# Patient Record
Sex: FEMALE | Race: BLACK OR AFRICAN AMERICAN | Employment: UNEMPLOYED | URBAN - NONMETROPOLITAN AREA
[De-identification: names, ages, dates, MRNs, and addresses within clinical notes are randomized per-mention and may not be internally consistent; named-entity substitution may affect disease eponyms.]

---

## 2019-10-24 ENCOUNTER — HOSPITAL ENCOUNTER (OUTPATIENT)
Dept: INPATIENT UNIT | Age: 64
Discharge: HOME | End: 2019-10-24
Attending: INTERNAL MEDICINE | Admitting: INTERNAL MEDICINE
Payer: COMMERCIAL

## 2019-10-24 VITALS
HEART RATE: 72 BPM | HEIGHT: 62 IN | OXYGEN SATURATION: 93 % | BODY MASS INDEX: 38.09 KG/M2 | DIASTOLIC BLOOD PRESSURE: 61 MMHG | SYSTOLIC BLOOD PRESSURE: 132 MMHG | WEIGHT: 207 LBS | TEMPERATURE: 97.7 F | RESPIRATION RATE: 26 BRPM

## 2019-10-24 LAB
ABO: NORMAL
ALBUMIN SERPL-MCNC: 3.8 G/DL (ref 3.5–5.1)
ALP BLD-CCNC: 59 U/L
ALT SERPL-CCNC: 8 U/L (ref 11–66)
ANION GAP SERPL CALCULATED.3IONS-SCNC: 12 MEQ/L (ref 8–16)
ANTIBODY SCREEN: NORMAL
AST SERPL-CCNC: 11 U/L (ref 5–40)
BILIRUB SERPL-MCNC: 0.8 MG/DL (ref 0.3–1.2)
BUN BLDV-MCNC: 10 MG/DL (ref 7–22)
CALCIUM SERPL-MCNC: 9.4 MG/DL (ref 8.5–10.5)
CHLORIDE BLD-SCNC: 105 MEQ/L (ref 98–111)
CHOLESTEROL, TOTAL: 139 MG/DL (ref 100–199)
CO2: 23 MEQ/L (ref 23–33)
COLLECTED BY:: ABNORMAL
CREAT SERPL-MCNC: 0.8 MG/DL (ref 0.4–1.2)
EKG ATRIAL RATE: 48 BPM
EKG P AXIS: 49 DEGREES
EKG P-R INTERVAL: 166 MS
EKG Q-T INTERVAL: 430 MS
EKG QRS DURATION: 84 MS
EKG QTC CALCULATION (BAZETT): 384 MS
EKG R AXIS: 38 DEGREES
EKG T AXIS: 35 DEGREES
EKG VENTRICULAR RATE: 48 BPM
ERYTHROCYTE [DISTWIDTH] IN BLOOD BY AUTOMATED COUNT: 13 % (ref 11.5–14.5)
ERYTHROCYTE [DISTWIDTH] IN BLOOD BY AUTOMATED COUNT: 45.5 FL (ref 35–45)
GFR SERPL CREATININE-BSD FRML MDRD: > 90 ML/MIN/1.73M2
GLUCOSE BLD-MCNC: 91 MG/DL (ref 70–108)
HCT VFR BLD CALC: 35.1 %
HDLC SERPL-MCNC: 47 MG/DL
HEMOGLOBIN: 11.4 GM/DL
INR BLD: 1.01 (ref 0.85–1.13)
LDL CHOLESTEROL CALCULATED: 74 MG/DL
MCH RBC QN AUTO: 31.2 PG (ref 26–33)
MCHC RBC AUTO-ENTMCNC: 32.5 GM/DL (ref 32.2–35.5)
MCV RBC AUTO: 96.2 FL
PLATELET # BLD: 225 THOU/MM3 (ref 130–400)
PMV BLD AUTO: 10.5 FL (ref 9.4–12.4)
POC O2 SATURATION: 64 % (ref 94–97)
POC O2 SATURATION: 67 % (ref 94–97)
POC O2 SATURATION: 67 % (ref 94–97)
POC O2 SATURATION: 93 % (ref 94–97)
POTASSIUM REFLEX MAGNESIUM: 4.5 MEQ/L (ref 3.5–5.2)
RBC # BLD: 3.65 MILL/MM3
RH FACTOR: NORMAL
SODIUM BLD-SCNC: 140 MEQ/L (ref 135–145)
SOURCE, BLOOD GAS: ABNORMAL
TOTAL PROTEIN: 7.6 G/DL (ref 6.1–8)
TRIGL SERPL-MCNC: 88 MG/DL (ref 0–199)
WBC # BLD: 7.7 THOU/MM3 (ref 4.8–10.8)

## 2019-10-24 PROCEDURE — 93460 R&L HRT ART/VENTRICLE ANGIO: CPT | Performed by: INTERNAL MEDICINE

## 2019-10-24 PROCEDURE — C1887 CATHETER, GUIDING: HCPCS

## 2019-10-24 PROCEDURE — 85027 COMPLETE CBC AUTOMATED: CPT

## 2019-10-24 PROCEDURE — 2500000003 HC RX 250 WO HCPCS

## 2019-10-24 PROCEDURE — 2709999900 HC NON-CHARGEABLE SUPPLY

## 2019-10-24 PROCEDURE — 85610 PROTHROMBIN TIME: CPT

## 2019-10-24 PROCEDURE — 2580000003 HC RX 258: Performed by: INTERNAL MEDICINE

## 2019-10-24 PROCEDURE — C1769 GUIDE WIRE: HCPCS

## 2019-10-24 PROCEDURE — 6360000002 HC RX W HCPCS

## 2019-10-24 PROCEDURE — 6370000000 HC RX 637 (ALT 250 FOR IP): Performed by: INTERNAL MEDICINE

## 2019-10-24 PROCEDURE — 86900 BLOOD TYPING SEROLOGIC ABO: CPT

## 2019-10-24 PROCEDURE — 80053 COMPREHEN METABOLIC PANEL: CPT

## 2019-10-24 PROCEDURE — 93005 ELECTROCARDIOGRAM TRACING: CPT | Performed by: INTERNAL MEDICINE

## 2019-10-24 PROCEDURE — 86901 BLOOD TYPING SEROLOGIC RH(D): CPT

## 2019-10-24 PROCEDURE — 80061 LIPID PANEL: CPT

## 2019-10-24 PROCEDURE — 36415 COLL VENOUS BLD VENIPUNCTURE: CPT

## 2019-10-24 PROCEDURE — C1894 INTRO/SHEATH, NON-LASER: HCPCS

## 2019-10-24 PROCEDURE — 82810 BLOOD GASES O2 SAT ONLY: CPT

## 2019-10-24 PROCEDURE — 6360000004 HC RX CONTRAST MEDICATION: Performed by: INTERNAL MEDICINE

## 2019-10-24 PROCEDURE — 86850 RBC ANTIBODY SCREEN: CPT

## 2019-10-24 RX ORDER — ROSUVASTATIN CALCIUM 5 MG/1
5 TABLET, COATED ORAL DAILY
COMMUNITY

## 2019-10-24 RX ORDER — DIPHENHYDRAMINE HCL 25 MG
50 TABLET ORAL ONCE
Status: DISCONTINUED | OUTPATIENT
Start: 2019-10-24 | End: 2019-10-24 | Stop reason: HOSPADM

## 2019-10-24 RX ORDER — SODIUM CHLORIDE 0.9 % (FLUSH) 0.9 %
10 SYRINGE (ML) INJECTION PRN
Status: DISCONTINUED | OUTPATIENT
Start: 2019-10-24 | End: 2019-10-24 | Stop reason: HOSPADM

## 2019-10-24 RX ORDER — SODIUM CHLORIDE 9 MG/ML
INJECTION, SOLUTION INTRAVENOUS CONTINUOUS
Status: DISCONTINUED | OUTPATIENT
Start: 2019-10-24 | End: 2019-10-24 | Stop reason: HOSPADM

## 2019-10-24 RX ORDER — SODIUM CHLORIDE 0.9 % (FLUSH) 0.9 %
10 SYRINGE (ML) INJECTION EVERY 12 HOURS SCHEDULED
Status: DISCONTINUED | OUTPATIENT
Start: 2019-10-24 | End: 2019-10-24 | Stop reason: SDUPTHER

## 2019-10-24 RX ORDER — LOSARTAN POTASSIUM 25 MG/1
50 TABLET ORAL DAILY
COMMUNITY

## 2019-10-24 RX ORDER — ASPIRIN 325 MG
325 TABLET ORAL ONCE
Status: COMPLETED | OUTPATIENT
Start: 2019-10-24 | End: 2019-10-24

## 2019-10-24 RX ORDER — POTASSIUM CHLORIDE 750 MG/1
10 CAPSULE, EXTENDED RELEASE ORAL 2 TIMES DAILY
COMMUNITY

## 2019-10-24 RX ORDER — ACETAMINOPHEN 325 MG/1
650 TABLET ORAL EVERY 4 HOURS PRN
Status: DISCONTINUED | OUTPATIENT
Start: 2019-10-24 | End: 2019-10-24 | Stop reason: HOSPADM

## 2019-10-24 RX ORDER — ONDANSETRON 2 MG/ML
4 INJECTION INTRAMUSCULAR; INTRAVENOUS EVERY 6 HOURS PRN
Status: DISCONTINUED | OUTPATIENT
Start: 2019-10-24 | End: 2019-10-24 | Stop reason: HOSPADM

## 2019-10-24 RX ORDER — SODIUM CHLORIDE 9 MG/ML
100 INJECTION, SOLUTION INTRAVENOUS CONTINUOUS
Status: DISCONTINUED | OUTPATIENT
Start: 2019-10-24 | End: 2019-10-24 | Stop reason: HOSPADM

## 2019-10-24 RX ORDER — SODIUM CHLORIDE 0.9 % (FLUSH) 0.9 %
10 SYRINGE (ML) INJECTION PRN
Status: DISCONTINUED | OUTPATIENT
Start: 2019-10-24 | End: 2019-10-24 | Stop reason: SDUPTHER

## 2019-10-24 RX ORDER — DILTIAZEM HYDROCHLORIDE 120 MG/1
120 CAPSULE, COATED, EXTENDED RELEASE ORAL DAILY
Qty: 30 CAPSULE | Refills: 3 | Status: SHIPPED | OUTPATIENT
Start: 2019-10-24

## 2019-10-24 RX ORDER — BUMETANIDE 1 MG/1
2 TABLET ORAL DAILY
COMMUNITY

## 2019-10-24 RX ORDER — SODIUM CHLORIDE 0.9 % (FLUSH) 0.9 %
10 SYRINGE (ML) INJECTION EVERY 12 HOURS SCHEDULED
Status: DISCONTINUED | OUTPATIENT
Start: 2019-10-24 | End: 2019-10-24 | Stop reason: HOSPADM

## 2019-10-24 RX ORDER — ATROPINE SULFATE 0.4 MG/ML
0.5 AMPUL (ML) INJECTION
Status: DISCONTINUED | OUTPATIENT
Start: 2019-10-24 | End: 2019-10-24 | Stop reason: HOSPADM

## 2019-10-24 RX ADMIN — SODIUM CHLORIDE: 9 INJECTION, SOLUTION INTRAVENOUS at 10:58

## 2019-10-24 RX ADMIN — ASPIRIN 325 MG: 325 TABLET, FILM COATED ORAL at 10:58

## 2019-10-24 RX ADMIN — IOPAMIDOL 90 ML: 755 INJECTION, SOLUTION INTRAVENOUS at 15:25

## 2019-12-29 NOTE — PROGRESS NOTES
showed the RCA is a dominant artery. It has an  apparent takeoff from the left coronary system with chimney-like shape,  nonobstructive disease of the mid RCA was noticed.     The left main cannulated with a Voda catheter. This is very short. The  circumflex was patent with mild atheromatous nonobstructive disease. The LAD and its major branches were patent.     The left ventriculogram showed the preserved systolic function of the  left ventricle and ejection fraction about 60%, 2 to 3+ mitral regurg. No gradient across the aortic valve.     The patient tolerated the procedure well.     IMPRESSION:  1. Moderately severe pulmonary hypertension with the PA pressure of  69/22 and wedge around 30.  2.  Diastolic dysfunction of the left ventricle. Left ventricular  end-diastolic pressure around 27.  3.  Mitral valve prolapse and 2 to 3+ mitral regurg and no gradient  across the aortic valve. 4.  The apparent takeoff of the RCA and RCA has  nonobstructive  disease in its mid course. 5.  Left main is very short, it bifurcates to the LAD and circumflex. The circumflex was patent. The LAD also was patent. The patient has no  acute complication from the procedure.     SUMMARY:  1.  Preserved systolic function of the left ventricle and ejection  fraction about 55%. 2.  2+ mitral regurgitation at least moderate central jet with mitral  valve apparatus prolapse, mild. 3.  Patent coronary arteries. 4.  Moderately severe pulmonary hypertension with no step-up in the  oxygen saturation from different chambers of the right side of the  heart. 5.  Diastolic dysfunction of the left ventricle. 6.  Estimated blood loss less than 50 mL. The patient has no acute  complication from the procedure.     RECOMMENDATION:  The patient will be seen by Pulmonary Service for sleep  apnea and for her pulmonary hypertension.   The echocardiogram will be  reviewed and keep very close eye on this pulmonary hypertension and the  mitral regurg. The patient will continue with the aggressive medical  treatment.       Assessment:  -Snoring without witnessed apneas,frequent nocturnal awakenings and excessive daytime sleepiness to evaluate for obstructive sleep apnea. -Inadequate sleep hygiene.  -2 to 3+ mitral regurgitation.  -Moderately severe pulmonary hypertension with the PA pressure of  69/22 and wedge around 30.  -Chronic diastolic CHF. -Hypertension on meds. -GERD (gastroesophageal reflux disease). -S/p Tonsillectomy at the age of 14years.  -Hypersomnia ( Excessive daytime sleepiness) may be due to obstructive sleep apnea Vs Inadequate sleep hygiene.  -Circadian sleep disorder with phase delay.  -Chronic hx of tobacco smoking. She is still smoking. Recommendations/Plan:  -Start patient on Melatonin 3mg PO daily at bed time PRN. She was instructed to not to drive any motor vehicles or operate heavy equipment after taking the pill until sleep symptoms are under control. She was detailed about mechanism of action of drug along with associated side effects. She agreed to take the risk and medication. She verbalizes understanding.  -Will schedule patient for polysomnogram in the sleep lab. -I had a discussion with patient regarding avialable treatment options for her sleep disorder breathing including but not limited to CPAP titration in the sleep lab Vs.Dental appliance placement with referral to a local dentist Vs other available surgical options including Uvulopalatopharyngoplasty, maxillomandibular ostomy and tracheostomy as last option. At the end of discussion, she is not decided on her   treatment if she found to have obstructive sleep apnea at this time.  -We will see Hemalatha Sterling back in 1week after the sleep study to go over the sleep study results and further management options.  -She was educated to practice good sleep hygiene practices. She  was provided with a good sleep hygiene hand out.   -Hemalatha Sterling

## 2020-01-20 ENCOUNTER — INITIAL CONSULT (OUTPATIENT)
Dept: PULMONOLOGY | Age: 65
End: 2020-01-20
Payer: COMMERCIAL

## 2020-01-20 VITALS
HEART RATE: 75 BPM | SYSTOLIC BLOOD PRESSURE: 136 MMHG | HEIGHT: 62 IN | WEIGHT: 212.4 LBS | BODY MASS INDEX: 39.08 KG/M2 | OXYGEN SATURATION: 99 % | DIASTOLIC BLOOD PRESSURE: 84 MMHG

## 2020-01-20 PROCEDURE — G8417 CALC BMI ABV UP PARAM F/U: HCPCS | Performed by: INTERNAL MEDICINE

## 2020-01-20 PROCEDURE — G8427 DOCREV CUR MEDS BY ELIG CLIN: HCPCS | Performed by: INTERNAL MEDICINE

## 2020-01-20 PROCEDURE — G8484 FLU IMMUNIZE NO ADMIN: HCPCS | Performed by: INTERNAL MEDICINE

## 2020-01-20 PROCEDURE — 3017F COLORECTAL CA SCREEN DOC REV: CPT | Performed by: INTERNAL MEDICINE

## 2020-01-20 PROCEDURE — 4004F PT TOBACCO SCREEN RCVD TLK: CPT | Performed by: INTERNAL MEDICINE

## 2020-01-20 PROCEDURE — 99204 OFFICE O/P NEW MOD 45 MIN: CPT | Performed by: INTERNAL MEDICINE

## 2020-01-20 RX ORDER — LANOLIN ALCOHOL/MO/W.PET/CERES
3 CREAM (GRAM) TOPICAL NIGHTLY PRN
Qty: 30 TABLET | Refills: 11 | Status: SHIPPED | OUTPATIENT
Start: 2020-01-20 | End: 2021-01-05

## 2020-01-20 RX ORDER — LINACLOTIDE 290 UG/1
290 CAPSULE, GELATIN COATED ORAL
COMMUNITY
Start: 2019-12-31 | End: 2021-03-30 | Stop reason: SDUPTHER

## 2020-01-20 NOTE — PATIENT INSTRUCTIONS
Recommendations/Plan:  -Start patient on Melatonin 3mg PO daily at bed time PRN. She was instructed to not to drive any motor vehicles or operate heavy equipment after taking the pill until sleep symptoms are under control. She was detailed about mechanism of action of drug along with associated side effects. She agreed to take the risk and medication. She verbalizes understanding.  -Will schedule patient for polysomnogram in the sleep lab. -I had a discussion with patient regarding avialable treatment options for her sleep disorder breathing including but not limited to CPAP titration in the sleep lab Vs.Dental appliance placement with referral to a local dentist Vs other available surgical options including Uvulopalatopharyngoplasty, maxillomandibular ostomy and tracheostomy as last option. At the end of discussion, she is not decided on her   treatment if she found to have obstructive sleep apnea at this time.  -We will see Darren Seo back in 1week after the sleep study to go over the sleep study results and further management options.  -She was educated to practice good sleep hygiene practices. She  was provided with a good sleep hygiene hand out. -Darren Seo was educated about Chronotherapy for Circadian sleep disorder with phase delay and mechanism. She was advised to start Chronotherapy to maintain desired sleep schedule.  -She was educated about sleep restriction therapy and advised to practice to improve her insomnia. -She was educated about stimulus control therapy and advise to practice to improve her insomnia. - Patient educated to quit smoking as soon as possible. Patient verbalizes understanding of adverse consequences of tobacco smoking (3 minutes). -Lexi was advised to make earlier appointment with my clinic if she develops any worsening of sleep symptoms.  She verbalizes understanding.  -Lexi was advised to not to drive any motor vehicles or operate heavy equipment until her sleep symptoms are under good control. Lexi Lester verbalizes understanding.  -She was advised to loose weight by controlling diet and doing exercise once cleared by her family physician.   - Lino Fry was educated about my impression and plan. She verbalizes understanding.

## 2020-02-17 ENCOUNTER — HOSPITAL ENCOUNTER (OUTPATIENT)
Dept: SLEEP CENTER | Age: 65
Discharge: HOME | End: 2020-02-19
Payer: COMMERCIAL

## 2020-02-17 PROCEDURE — 95810 POLYSOM 6/> YRS 4/> PARAM: CPT

## 2020-02-18 LAB — STATUS: NORMAL

## 2020-02-19 NOTE — PROGRESS NOTES
[address] [id number]                               SLEEP STUDY REPORT    PATIENT NAME: Deyvi Weir                  :        1955  MED REC NO:   [id number]                           ROOM:  ACCOUNT NO:   [de-identified]                           ADMIT DATE: 2020  PROVIDER:     Herberth Coy MD    DATE OF STUDY:  2020    SLEEP STUDY REPORT    REFERRING PROVIDER:  Marta Garcia MD.    The patient's height is 62 inches, weight is 212 pounds with a BMI of  38.8. HISTORY:  The patient is a 28-year-old female who was initially  evaluated by me on 2020. The patient currently suffering with  hypersomnia with [place] Sleepiness Score of 12. She also gave a  history of snoring without witnessed apneas. The patient having  frequent nocturnal awakenings. The patient scheduled for a sleep study  to evaluate for sleep apnea as an etiology for hypersomnia. The patient  had associated comorbidities including hypertension, chronic diastolic  congestive heart failure. The patient also had a history of tobacco  smoking. METHODS:  The patient underwent digital polysomnography in compliance  with the standards and specifications from the AASM Manual including the  simultaneous recording of 3 EEG channels (F4-M1, C4-M1, and O2-M1 with  back up electrodes F3-M2, C3-M2, and O1-M2), 2 EOG channels (E1-M2, and  E2-M1,), EMG (chin, left & right leg), EKG, Nonin pulse oximetry with   less than 2 second averaging time, body position, airflow recorded by  oral-nasal thermal sensor and nasal air pressure transducer, plus  respiratory effort recorded by calibrated respiratory inductance  plethysmography (RIP), flow volume loop, sound and video.   Sleep staging  and scoring followed the standard put forth by the American Academy of  Sleep Medicine and utilized the 4A obstructive hypopnea event  desaturation of 4 percent sleep apnea. 5.  Chronic diastolic congestive heart failure. 6.  Moderately severe pulmonary hypertension with increased wedge  pressure. RECOMMENDATIONS:  1. For the patient's sleep-disordered breathing, the patient need  treatment. 2.  If the patient choose to go for a CPAP titration as a treatment, the  patient should be scheduled for a in-lab CPAP titration as soon as  possible and follow up with my clinic in six to eight weeks. I  recommended CPAP therapy for clinical reevaluation with review of  download. 3.  If the patient wish to discuss her sleep study reports, the patient  should be scheduled for a followup with my clinic as soon as possible. Thanks to Dr. Marta Garcia for giving me this opportunity to participate  in the care of this pleasant lady.         Mike Elena MD    D: 02/18/2020 10:02:01       T: 02/18/2020 11:44:50     [email or web address]  Job#: [id number]     Doc#: [id number]    CC:

## 2020-03-11 ENCOUNTER — OFFICE VISIT (OUTPATIENT)
Dept: PULMONOLOGY | Age: 65
End: 2020-03-11
Payer: COMMERCIAL

## 2020-03-11 VITALS
DIASTOLIC BLOOD PRESSURE: 68 MMHG | SYSTOLIC BLOOD PRESSURE: 114 MMHG | HEIGHT: 62 IN | BODY MASS INDEX: 39.75 KG/M2 | WEIGHT: 216 LBS | OXYGEN SATURATION: 100 % | HEART RATE: 56 BPM

## 2020-03-11 PROCEDURE — G8427 DOCREV CUR MEDS BY ELIG CLIN: HCPCS | Performed by: INTERNAL MEDICINE

## 2020-03-11 PROCEDURE — 4004F PT TOBACCO SCREEN RCVD TLK: CPT | Performed by: INTERNAL MEDICINE

## 2020-03-11 PROCEDURE — 99213 OFFICE O/P EST LOW 20 MIN: CPT | Performed by: INTERNAL MEDICINE

## 2020-03-11 PROCEDURE — G8484 FLU IMMUNIZE NO ADMIN: HCPCS | Performed by: INTERNAL MEDICINE

## 2020-03-11 PROCEDURE — 3017F COLORECTAL CA SCREEN DOC REV: CPT | Performed by: INTERNAL MEDICINE

## 2020-03-11 PROCEDURE — G8417 CALC BMI ABV UP PARAM F/U: HCPCS | Performed by: INTERNAL MEDICINE

## 2020-03-11 RX ORDER — FLUTICASONE PROPIONATE 50 MCG
2 SPRAY, SUSPENSION (ML) NASAL DAILY
Qty: 1 BOTTLE | Refills: 11 | Status: SHIPPED | OUTPATIENT
Start: 2020-03-11 | End: 2021-03-11

## 2020-03-11 NOTE — PATIENT INSTRUCTIONS
Patient Education        Learning About Benefits From Quitting Smoking  How does quitting smoking make you healthier? If you're thinking about quitting smoking, you may have a few reasons to be smoke-free. Your health may be one of them. · When you quit smoking, you lower your risks for cancer, lung disease, heart attack, stroke, blood vessel disease, and blindness from macular degeneration. · When you're smoke-free, you get sick less often, and you heal faster. You are less likely to get colds, flu, bronchitis, and pneumonia. · As a nonsmoker, you may find that your mood is better and you are less stressed. When and how will you feel healthier? Quitting has real health benefits that start from day 1 of being smoke-free. And the longer you stay smoke-free, the healthier you get and the better you feel. The first hours  · After just 20 minutes, your blood pressure and heart rate go down. That means there's less stress on your heart and blood vessels. · Within 12 hours, the level of carbon monoxide in your blood drops back to normal. That makes room for more oxygen. With more oxygen in your body, you may notice that you have more energy than when you smoked. After 2 weeks  · Your lungs start to work better. · Your risk of heart attack starts to drop. After 1 month  · When your lungs are clear, you cough less and breathe deeper, so it's easier to be active. · Your sense of taste and smell return. That means you can enjoy food more than you have since you started smoking. Over the years  · After 1 year, your risk of heart disease is half what it would be if you kept smoking. · After 5 years, your risk of stroke starts to shrink. Within a few years after that, it's about the same as if you'd never smoked. · After 10 years, your risk of dying from lung cancer is cut by about half. And your risk for many other types of cancer is lower too. How would quitting help others in your life?   When you quit weeks after starting on positive airway pressure therapy  for clinical reevaluation with review of download. Sheadvised to bring her CPAP/BiPAP/AutoSV machine or smart memory card from machine for download review.  -She was advised to practice good sleep hygiene practices. - Patient educated to quit smoking as soon as possible. Patient verbalizes understanding of adverse consequences of tobacco smoking (4 minutes). -Andrese was advised to make earlier appointment with my clinic if she develops any worsening of sleep symptoms. She verbalizes understanding.  -She was advised to loose weight by controlling diet and doing exercise once cleared by her family physician.   -She was advised to call [employer] regarding supplies if needed.  -She was advised to call my office for earlier appointment if needed for worsening of sleep symptoms. -Yuri Metzger was educated about my impression and plan. She verbalizes understanding.

## 2020-03-11 NOTE — PROGRESS NOTES
 melatonin 3 MG TABS tablet Take 1 tablet by mouth nightly as needed (insomnia) 30 tablet 11    losartan (COZAAR) 25 MG tablet Take 25 mg by mouth daily      bumetanide (BUMEX) 1 MG tablet Take 1 mg by mouth daily      potassium chloride (MICRO-K) 10 MEQ extended release capsule Take 10 mEq by mouth 2 times daily      rosuvastatin (CRESTOR) 5 MG tablet Take 5 mg by mouth daily      diltiazem (CARDIZEM CD) 120 MG extended release capsule Take 1 capsule by mouth daily 30 capsule 3     No current facility-administered medications for this visit. Family History   Problem Relation Age of Onset    Stroke Mother     Heart Disease Mother     High Blood Pressure Mother     Diabetes Mother     Heart Disease Father     Heart Disease Sister     Other Sister     Other Brother     Heart Disease Brother     High Blood Pressure Brother     Diabetes Brother           /68 (Site: Left Upper Arm, Position: Sitting, Cuff Size: Medium Adult)   Pulse 56   Ht 5' 2\" (1.575 m)   Wt 216 lb (98 kg)   SpO2 100% Comment: on room air at rest  BMI 39.51 kg/m²     BMI:  Body mass index is 39.51 kg/m². Mallampati airway Class:IV  Neck Circumference: 14 Inches  [place] sleepiness score 3/11/20: 2  SAQLI: 53        Physical Exam :  Constitutional: Patient appears moderately built and moderately nourished. No distress. Patient is oriented to person, place, and time. HENT: Hypertrophied nasal turbinates with pale nasal mucosa bilaterally. Head: Normocephalic and atraumatic. Right Ear: External ear normal.   Left Ear: External ear normal.   Mouth/Throat: Oropharynx is clear and moist.   Eyes: Conjunctivae are normal. Pupils are equal and reactive to light. No scleral icterus. Neck: Neck supple. No JVD present. Cardiovascular: Normal rate, regular rhythm, normal heart sounds. No murmur heard. Pulmonary/Chest: Effort normal and breath sounds normal. No stridor. No respiratory distress. No wheezes.  No rales.   Abdominal: Soft. Patient exhibits no distension. No tenderness. Musculoskeletal: Normal range of motion. Extremities: Patient exhibits no erythema or no edema. Lymphadenopathy:  No cervical adenopathy. Neurological: Patient is alert and oriented to person, place, and time. Skin: Skin is warm and dry. Patient is not diaphoretic. Psychiatric: Patient  has a normal mood and affect. Diagnostic Data:    Sleep study done on :                             SLEEP STUDY REPORT     PATIENT NAME: Facundo Norton                  :        1955  MED REC NO:   [id number]                           ROOM:  ACCOUNT NO:   [de-identified]                           ADMIT DATE: 2020  PROVIDER:     Edwardo Coy MD     DATE OF STUDY:  2020     SLEEP STUDY REPORT     REFERRING PROVIDER:  Roma Jacobs MD.    IMPRESSION:  1. Mild obstructive sleep apnea with worsening of respiratory events  during REM sleep. 2.  Decreased and delayed REM sleep. 3.  Hypertension. 4.  Hypersomnia most likely due to sleep apnea. 5.  Chronic diastolic congestive heart failure. 6.  Moderately severe pulmonary hypertension with increased wedge  pressure. Diagnostic Data: 20   AHI: 11.8    Assesment:  - Mild obstructive sleep apnea with worsening of respiratory events  during REM sleep.  -Decreased and delayed REM sleep.  -Hypertension- under control.  -Hypersomnia most likely due to sleep apnea- improving with good sleep hygiene.  -Chronic diastolic congestive heart failure. -Moderately severe pulmonary hypertension with increased wedge Pressure.  -Allergic rhinitis- not under control. Recommendations/Plan:  - Start patient on Flonase 50mcg/INH 2sprays each nostril daily. She  was informed about adverse effects of Flonase. She was demonstrated in my office how to use Flonase.  She verbalizes understanding.  -I had a discussion with patient regarding avialable treatment options for her sleep disorder breathing including but not limited to CPAP titration in the sleep lab Vs.Dental appliance placement with referral to a local dentist Vs other available surgical options including Uvulopalatopharyngoplasty, maxillomandibular ostomy and tracheostomy as last option. At the end of discussion, she decided to go for the CPAP titration.  -she advised to keep good compliance with recommended positive airway pressure therapy to get optimal results and clinical improvement.  -Follow with my clinic in 6 to 8 weeks after starting on positive airway pressure therapy  for clinical reevaluation with review of download. Sheadvised to bring her CPAP/BiPAP/AutoSV machine or smart memory card from machine for download review.  -She was advised to practice good sleep hygiene practices. - Patient educated to quit smoking as soon as possible. Patient verbalizes understanding of adverse consequences of tobacco smoking (4 minutes). -Andrese was advised to make earlier appointment with my clinic if she develops any worsening of sleep symptoms. She verbalizes understanding.  -She was advised to loose weight by controlling diet and doing exercise once cleared by her family physician.   -She was advised to call [employer] regarding supplies if needed.  -She was advised to call my office for earlier appointment if needed for worsening of sleep symptoms. -Lino Fry was educated about my impression and plan. She verbalizes understanding.

## 2020-03-11 NOTE — PROGRESS NOTES
Chief Complaint: Leatha Whitney is here for a PSG follow up.  Requested to see Dr. Fred Harley    Mallampati airway Class:IV  Neck Circumference: 14 Inches    [place] sleepiness score 3/11/20: 2  SAQLI: 53    Diagnostic Data: 2/17/20   AHI: 11.8

## 2020-05-22 ENCOUNTER — HOSPITAL ENCOUNTER (OUTPATIENT)
Dept: SLEEP CENTER | Age: 65
Discharge: HOME | End: 2020-05-24
Payer: COMMERCIAL

## 2020-05-22 PROCEDURE — 95811 POLYSOM 6/>YRS CPAP 4/> PARM: CPT

## 2020-05-23 NOTE — PROGRESS NOTES
3. Prior to initiating study collect all appropriate PAP supplies  a. Tubing   b. Humidifier (filled with distilled water)  c. Masks   4. The technologist should assess and measure patient for most appropriate mask prior to start of study. 5. CPAP should be initiated at 5 cm H20.  a. More pressure at start of study may be necessary if patient is morbidly obese or unable to fall asleep on a pressure of 5 cm H20   6. If apneas or frequent hypopneas are present, pressure settings should be increased by 2 cm H20. If occasional hypopneas, snoring, or mask flow limitation are present, pressure settings should be increased by 1 cm H20 and maintained for at least fie minutes to determine if events improve or resolve. Pressure settings may need to be increased more quickly during REM sleep given the limited amount of REM during sleep and the need to treat events during this stage. 7. If a mask leak occurs, the tech should first fix the leakage before raising the pressure. Otherwise, the final pressure setting chosen for the patient may be too high. Once the mask leak has been fixed, decrease the pressure to the last setting where mouth breathing and/or mask leakage was not present, and then re-titrate as indicated. Make sure to document directly on the study the steps taken to resolve the leak and the type of masks used. Pressure setting usually do not need to be set as high with a nasal-mask than with a full-face mask. 8. The recording technologist should document directly on the study at least every 30 minutes. 9. If the patient takes a break from wearing the mask, do not decrease the CPAP pressure on attempted return to sleep unless the patient remains awake for 15 minutes or the patient specifically requests that the pressure be lowered. 10. Do not raise pressure for central apneas. If the patient develops central apneas, pressure setting may need to be lowered.    11. If the patient is unable to tolerate CPAP 5/ hour and centrals must be greater than 50% of total respiratory events. 13. Ensure that supine sleep has been seen on the chosen setting. Going above the chosen setting 1 or 2 cm H20 to show range may be helpful to ensure that the correct pressure has been established.

## 2020-05-28 LAB — STATUS: NORMAL

## 2020-05-29 NOTE — PROGRESS NOTES
diastolic congestive heart failure. 5.  Decreased and delayed REM sleep during titration study. 6.  Rare periodic limb movements with no significant arousals. RECOMMENDATIONS:  1. For the patient's sleep disordered breathing, we recommend starting  therapy with a CPAP pressure of 7 cm of water. 2.  Specific recommendations include the patient's choice of interface  was Vitera medium-sized full face mask. 3.  The patient should be scheduled for followup with my clinic in six  to eight weeks on recommended CPAP therapy for clinical re-evaluation  with review of download. Thanks to Hans Mesa MD for giving me this opportunity to participate  in the care of benito ladmiller.         Brenda Miranda MD    D: 05/28/2020 20:31:39       T: 05/28/2020 23:47:15     [email or web address]  Job#: [id number]     Doc#: [id number]    CC:

## 2020-06-01 ENCOUNTER — TELEPHONE (OUTPATIENT)
Dept: SLEEP CENTER | Age: 65
End: 2020-06-01

## 2020-07-15 NOTE — PROGRESS NOTES
[facility] Follow up note    Heather Smith            Chief Complaint:  Jaydon Mchugh is here for a 8 week EDMAR with [address] download                                      Chief complaint and [place]: Heather Smith is a 63 y. o.oldfemale came for follow up regarding her sleep apnea. She is currently using her positive airway pressure device with a CPAP pressure of 7cm H20. She was set up with her CPAP by phone by her [employer] company. She is not using her CPAP properly. She took her CPAP to [facility] 5days back. For the last 4days she is using her CPAP well. She denies any problems with machine or mask. She is sleeping well at night with out difficulty. She denies any daytime sleepiness. Review of Systems:   General/Constitutional: she gained 1lb of weight from the last visit with normal appetite. No fever or chills. HENT: Negative. Eyes: Negative. Upper respiratory tract: Occasional nasal stuffiness with post nasal drip. Lower respiratory tract/ lungs: No cough or sputum production. No hemoptysis. Cardiovascular: No palpitations or chest pain. Gastrointestinal: No nausea or vomiting. Neurological: No focal neurologiacal weakness. Extremities: No edema. Musculoskeletal: No complaints. Genitourinary: No complaints. Hematological: Negative. Psychiatric/Behavioral: Negative. Skin: No itching.         Past Medical History:   Diagnosis Date    Arthritis     right knee     GERD (gastroesophageal reflux disease)     IBS     Hyperlipidemia     Hypertension        Past Surgical History:   Procedure Laterality Date    BUNIONECTOMY Right     HYSTERECTOMY      KNEE SURGERY Right     ACL repair    TONSILLECTOMY         Social History     Tobacco Use    Smoking status: Current Every Day Smoker     Packs/day: 0.25     Years: 33.00     Pack years: 8.25     Types: Cigarettes     Start date: 28    Smokeless tobacco: Never Used    Tobacco comment: 5 per day 3/11/2020   Substance Use Topics    Alcohol use: Not Currently    Drug use: Not Currently       Allergies   Allergen Reactions    Lisinopril Itching       Current Outpatient Medications   Medication Sig Dispense Refill    fluticasone (FLONASE) 50 MCG/ACT nasal spray 2 sprays by Nasal route daily 1 Bottle 11    LINZESS 290 MCG CAPS capsule TK 1 C PO D      melatonin 3 MG TABS tablet Take 1 tablet by mouth nightly as needed (insomnia) 30 tablet 11    losartan (COZAAR) 25 MG tablet Take 25 mg by mouth daily      bumetanide (BUMEX) 1 MG tablet Take 1 mg by mouth daily      potassium chloride (MICRO-K) 10 MEQ extended release capsule Take 10 mEq by mouth 2 times daily      rosuvastatin (CRESTOR) 5 MG tablet Take 5 mg by mouth daily      diltiazem (CARDIZEM CD) 120 MG extended release capsule Take 1 capsule by mouth daily 30 capsule 3     No current facility-administered medications for this visit. Family History   Problem Relation Age of Onset    Stroke Mother     Heart Disease Mother     High Blood Pressure Mother     Diabetes Mother     Heart Disease Father     Heart Disease Sister     Other Sister     Other Brother     Heart Disease Brother     High Blood Pressure Brother     Diabetes Brother           /74 (Site: Left Upper Arm, Position: Sitting, Cuff Size: Medium Adult)   Pulse 72   Temp 96.9 °F (36.1 °C)   Ht 5' 2\" (1.575 m)   Wt 217 lb 9.6 oz (98.7 kg)   SpO2 98% Comment: on RA  BMI 39.80 kg/m²     BMI:  Body mass index is 39.8 kg/m². Mallampati airway Class:  4  Neck Circumference:  14 Inches  [place] sleepiness score 8/12/20: 8      Physical Exam :  Constitutional: Patient appears well built and well nourished. No distress. Patient is oriented to person, place, and time. HENT:   Head: Normocephalic and atraumatic.    Right Ear: External ear normal.   Left Ear: External ear normal.   Mouth/Throat: Oropharynx is clear and moist.   Eyes: Conjunctivae are normal. Pupils are equal and reactive to light. No scleral icterus. Neck: Neck supple. No JVD present. Cardiovascular: Normal rate, regular rhythm, normal heart sounds. No murmur heard. Pulmonary/Chest: Effort normal and breath sounds normal. No stridor. No respiratory distress. No wheezes. No rales. Abdominal: Soft. Patient exhibits no distension. No tenderness. Musculoskeletal: Normal range of motion. Extremities:Patient exhibits no edema and no tenderness. Lymphadenopathy:  No cervical adenopathy. Neurological: Patient is alert and oriented to person, place, and time. Skin: Skin is warm and dry. Patient is not diaphoretic. Psychiatric: Patient  has a normal mood and affect. Diagnostic Data:    SLEEP STUDY REPORT     PATIENT NAME: Jewel Constantino                  :        1955  MED REC NO:   [id number]                           ROOM:  ACCOUNT NO:   [de-identified]                           ADMIT DATE: 2020  PROVIDER:     Trace Coy MD     DATE OF STUDY:  2020     CPAP TITRATION STUDY REPORT     REFERRING PROVIDER:  Edu Gerardo MD  CPAP TITRATION STUDY:  The CPAP titration study was started with a CPAP  pressure of 5 cm of water and the CPAP was gradually increased to a CPAP  pressure of 7 cm of water by titrating to apneas and hypopneas. At a CPAP pressure of 7 cm of water, the patient spent 3 hours 13  minutes in bed. Out of 3 hours 13 minutes, the patient slept for a  period of 24.5 minutes in REM sleep and 2 hours 7 minutes in non-REM  sleep. At a CPAP pressure of 7 cm of water, the patient had a total of  4 obstructive hypopneas with an apnea-hypopnea index of 1.6. The  maximum oxygen desaturation recorded at this pressure was 88%, however,  majority of the time towards the end of the study, the patient's oxygen  saturation remained between 94% to 96% with a mean oxygen saturation of  92.6%.   This titration study was performed on room air.    IMPRESSION:  1. Mild obstructive sleep apnea. The patient had optimal titration to  a CPAP pressure of 7 cm of water. 2.  Hypertension. 3.  Hypersomnia, most likely due to sleep apnea. 4.  Chronic diastolic congestive heart failure. 5.  Decreased and delayed REM sleep during titration study. 6.  Rare periodic limb movements with no significant arousals. Diagnostic Data: 20   AHI 11.8  PAP Download:    Recorded compliance dates:  20 -  20  More than 4hour usage compliance was:  23%. Average residual Apnea- Hypoapnea index on current pressue was:0.9        PAP Type airsense 10    Level  7 cmh2o       Average usuage hours per day was: 3:30     Interface: ffm      Provider:  []?[facility]             []?[facility]                        []?[employer]          []? [facility]                           []?[facility]      [x]? Other: [facility]    Echocardiogram:None available in [facility]IT. [address] [place] [address]                             CARDIAC CATHETERIZATION     PATIENT NAME: Armond Castelan                    :        1955  MED REC NO:   [id number]                           ROOM:       0016  ACCOUNT NO:   [de-identified]                           ADMIT DATE: 10/24/2019  PROVIDER:     Cordell Miramontes M.D.     Clara Balk:  10/24/2019       IMPRESSION:  1. Moderately severe pulmonary hypertension with the PA pressure of  69/22 and wedge around 30.  2.  Diastolic dysfunction of the left ventricle. Left ventricular  end-diastolic pressure around 27.  3.  Mitral valve prolapse and 2 to 3+ mitral regurg and no gradient  across the aortic valve. 4.  The apparent takeoff of the RCA and RCA has _____ nonobstructive  disease in its mid course. 5.  Left main is very short, it bifurcates to the LAD and circumflex. The circumflex was patent. The LAD also was patent. The patient has no  acute complication from the procedure.      SUMMARY:  1. She is aware of the consequences of poor compliance to her recommended positive air way pressure therapy including increased risk for cardiovascular and cerebrovascular events and morbidity including death.  -She was informed about the possibility of loosing his CPAP if she don't use it with good compliance for >4hours i.e >70%. She verbalizes understanding.  - Continue patient on Flonase 50mcg/INH 2sprays each nostril daily.  - She quit taking Melatonin 3mg po QHS. --She was advised to loose weight by controlling diet and doing exercise once cleared by her Cardiologist Dr. Bertha Pat.   -She was advised to call my office for earlier appointment if needed for worsening of sleep symptoms. -Pavithra Bates was educated about my impression and plan. Patient verbalizes understanding.

## 2020-08-12 ENCOUNTER — OFFICE VISIT (OUTPATIENT)
Dept: PULMONOLOGY | Age: 65
End: 2020-08-12
Payer: COMMERCIAL

## 2020-08-12 VITALS
WEIGHT: 217.6 LBS | TEMPERATURE: 96.9 F | HEIGHT: 62 IN | BODY MASS INDEX: 40.04 KG/M2 | SYSTOLIC BLOOD PRESSURE: 118 MMHG | OXYGEN SATURATION: 98 % | HEART RATE: 72 BPM | DIASTOLIC BLOOD PRESSURE: 74 MMHG

## 2020-08-12 PROCEDURE — 3017F COLORECTAL CA SCREEN DOC REV: CPT | Performed by: INTERNAL MEDICINE

## 2020-08-12 PROCEDURE — G8427 DOCREV CUR MEDS BY ELIG CLIN: HCPCS | Performed by: INTERNAL MEDICINE

## 2020-08-12 PROCEDURE — G8417 CALC BMI ABV UP PARAM F/U: HCPCS | Performed by: INTERNAL MEDICINE

## 2020-08-12 PROCEDURE — 4004F PT TOBACCO SCREEN RCVD TLK: CPT | Performed by: INTERNAL MEDICINE

## 2020-08-12 PROCEDURE — 99213 OFFICE O/P EST LOW 20 MIN: CPT | Performed by: INTERNAL MEDICINE

## 2020-08-12 NOTE — PATIENT INSTRUCTIONS
Patient Education        Stopping Smoking: Care Instructions  Your Care Instructions     Cigarette smokers crave the nicotine in cigarettes. Giving it up is much harder than simply changing a habit. Your body has to stop craving the nicotine. It is hard to quit, but you can do it. There are many tools that people use to quit smoking. You may find that combining tools works best for you. There are several steps to quitting. First you get ready to quit. Then you get support to help you. After that, you learn new skills and behaviors to become a nonsmoker. For many people, a necessary step is getting and using medicine. Your doctor will help you set up the plan that best meets your needs. You may want to attend a smoking cessation program to help you quit smoking. When you choose a program, look for one that has proven success. Ask your doctor for ideas. You will greatly increase your chances of success if you take medicine as well as get counseling or join a cessation program.  Some of the changes you feel when you first quit tobacco are uncomfortable. Your body will miss the nicotine at first, and you may feel short-tempered and grumpy. You may have trouble sleeping or concentrating. Medicine can help you deal with these symptoms. You may struggle with changing your smoking habits and rituals. The last step is the tricky one: Be prepared for the smoking urge to continue for a time. This is a lot to deal with, but keep at it. You will feel better. Follow-up care is a key part of your treatment and safety. Be sure to make and go to all appointments, and call your doctor if you are having problems. It's also a good idea to know your test results and keep a list of the medicines you take. How can you care for yourself at home? · Ask your family, friends, and coworkers for support. You have a better chance of quitting if you have help and support.   · Join a support group, such as Nicotine Anonymous, for people who are trying to quit smoking. · Consider signing up for a smoking cessation program, such as the American Lung Association's Freedom from Smoking program.  · Get text messaging support. Go to the website at www.smokefree. gov to sign up for the [facility] program.  · Set a quit date. Pick your date carefully so that it is not right in the middle of a big deadline or stressful time. Once you quit, do not even take a puff. Get rid of all ashtrays and lighters after your last cigarette. Clean your house and your clothes so that they do not smell of smoke. · Learn how to be a nonsmoker. Think about ways you can avoid those things that make you reach for a cigarette. ? Avoid situations that put you at greatest risk for smoking. For some people, it is hard to have a drink with friends without smoking. For others, they might skip a coffee break with coworkers who smoke. ? Change your daily routine. Take a different route to work or eat a meal in a different place. · Cut down on stress. Calm yourself or release tension by doing an activity you enjoy, such as reading a book, taking a hot bath, or gardening. · Talk to your doctor or pharmacist about nicotine replacement therapy, which replaces the nicotine in your body. You still get nicotine but you do not use tobacco. Nicotine replacement products help you slowly reduce the amount of nicotine you need. These products come in several forms, many of them available over-the-counter:  ? Nicotine patches  ? Nicotine gum and lozenges  ? Nicotine inhaler  · Ask your doctor about bupropion (Wellbutrin) or varenicline (Chantix), which are prescription medicines. They do not contain nicotine. They help you by reducing withdrawal symptoms, such as stress and anxiety. · Some people find hypnosis, acupuncture, and massage helpful for ending the smoking habit. · Eat a healthy diet and get regular exercise.  Having healthy habits will help your body move past its craving for nicotine. · Be prepared to keep trying. Most people are not successful the first few times they try to quit. Do not get mad at yourself if you smoke again. Make a list of things you learned and think about when you want to try again, such as next week, next month, or next year. Where can you learn more? Go to https://MaxCDNpepiceweb.[employer]. org and sign in to your [employer] account. Enter H925 in the [facility] box to learn more about \"Stopping Smoking: Care Instructions. \"     If you do not have an account, please click on the \"Sign Up Now\" link. Current as of: March 12, 2020               Content Version: 12.5  © 2006-2020 [employer], [employer]. Care instructions adapted under license by [facility] [facility] ([facility]). If you have questions about a medical condition or this instruction, always ask your healthcare professional. [place] [address] any warranty or liability for your use of this information. Recommendations/Plan:  -She was advised to continue current positive airway pressure therapy with above described pressure.  -She was advised to keep good compliance with current recommended pressure to get optimal results and clinical improvement.  -Follow with my clinic in 3months for clinical reevaluation with review of download. -She was advised to call [employer] regarding supplies if needed. - She is aware of the consequences of poor compliance to her recommended positive air way pressure therapy including increased risk for cardiovascular and cerebrovascular events and morbidity including death.  -She was informed about the possibility of loosing his CPAP if she don't use it with good compliance for >4hours i.e >70%. She verbalizes understanding.  - Continue patient on Flonase 50mcg/INH 2sprays each nostril daily.  - She quit taking Melatonin 3mg po QHS.   --She was advised to loose weight by controlling diet and doing exercise once cleared by her Cardiologist Dr. Yousuf Hayes.

## 2020-08-12 NOTE — PROGRESS NOTES
Chief Complaint:  Caren Valdez is here for a 8 week EDMAR with [facility] download     Mallampati airway Class:  4  Neck Circumference:  14 Inches    [place] sleepiness score 8/12/20:        Diagnostic Data: 2/17/20   AHI 11.8  PAP Download:    Recorded compliance dates:  7/11/20 -  8/9/20  More than 4hour usage compliance was:  23%.   Average residual Apnea- Hypoapnea index on current pressue was:0.9       PAP Type airsense 10    Level  7 cmh2o       Average usuage hours per day was: 3:30    Interface: ffm     Provider:  [][facility]  []John  [][employer]  [][facility]         [][facility] [x]Other: [facility]

## 2020-10-07 ENCOUNTER — HOSPITAL ENCOUNTER (OUTPATIENT)
Dept: ULTRASOUND IMAGING | Age: 65
Discharge: HOME | End: 2020-10-07
Payer: COMMERCIAL

## 2020-10-07 PROCEDURE — 76705 ECHO EXAM OF ABDOMEN: CPT

## 2020-11-01 NOTE — PROGRESS NOTES
[facility] Follow up note    Brittney Miller            Chief Complaint:  Paramjit Landers is here for a  3 month sleep follow up. Chief complaint and [place]: Brittney Miller is a 63 y. o.oldfemale came for follow up regarding her sleep apnea. She is currently using her positive airway pressure device with a CPAP pressure of 7cm H20. She is currently waiting for a new tubing and CPAP supplies. Patient still having suffocation feeling some times. She denies any problems with machine or mask. She is sleeping well at night with out difficulty. She denies any daytime sleepiness. Review of Systems:   General/Constitutional: she gained 3lbs of weight from the last visit with normal appetite. No fever or chills. HENT: Negative. Eyes: Negative. Upper respiratory tract: Frequent nasal stuffiness with post nasal drip. She is currently using Flonase with good complaints with no significant improvement in her symptoms. She never saw any ENT specialist.  Lower respiratory tract/ lungs: Frequent cough with clear sputum production. No hemoptysis. Cardiovascular: No palpitations or chest pain. Gastrointestinal: No nausea or vomiting. Neurological: No focal neurologiacal weakness. Extremities: No edema. Musculoskeletal: No complaints. Genitourinary: No complaints. Hematological: Negative. Psychiatric/Behavioral: Negative. Skin: No itching.       Past Medical History:   Diagnosis Date    Acid reflux     Anemia     Arthritis     right knee     Constipation     GERD (gastroesophageal reflux disease)     IBS     Heart disease     Heartburn     Hyperlipidemia     Hypertension     Osteoporosis        Past Surgical History:   Procedure Laterality Date    BREAST LUMPECTOMY  1973    BUNIONECTOMY Right     HYSTERECTOMY  2000    KNEE SURGERY Right     ACL repair    TONSILLECTOMY  1973       Social History Tobacco Use    Smoking status: Current Every Day Smoker     Packs/day: 0.25     Years: 33.00     Pack years: 8.25     Types: Cigarettes     Start date: 28    Smokeless tobacco: Never Used    Tobacco comment: 5 per day 3/11/2020   Substance Use Topics    Alcohol use: Not Currently    Drug use: Not Currently       Allergies   Allergen Reactions    Lisinopril Itching       Current Outpatient Medications   Medication Sig Dispense Refill    fluticasone (FLONASE) 50 MCG/ACT nasal spray 2 sprays by Nasal route daily 1 Bottle 11    LINZESS 290 MCG CAPS capsule TK 1 C PO D      melatonin 3 MG TABS tablet Take 1 tablet by mouth nightly as needed (insomnia) 30 tablet 11    losartan (COZAAR) 25 MG tablet Take 50 mg by mouth daily       bumetanide (BUMEX) 1 MG tablet Take 2 mg by mouth daily       potassium chloride (MICRO-K) 10 MEQ extended release capsule Take 10 mEq by mouth 2 times daily      rosuvastatin (CRESTOR) 5 MG tablet Take 5 mg by mouth daily      diltiazem (CARDIZEM CD) 120 MG extended release capsule Take 1 capsule by mouth daily 30 capsule 3     No current facility-administered medications for this visit. Family History   Problem Relation Age of Onset    Stroke Mother     Heart Disease Mother     High Blood Pressure Mother     Diabetes Mother     Heart Disease Father     Heart Disease Sister     Other Sister     Other Brother     Heart Disease Brother     High Blood Pressure Brother     Diabetes Brother     Colon Cancer Neg Hx     Colon Polyps Neg Hx     Esophageal Cancer Neg Hx     Liver Cancer Neg Hx     Rectal Cancer Neg Hx     Stomach Cancer Neg Hx           /72 (Site: Left Upper Arm, Position: Sitting, Cuff Size: Medium Adult)   Pulse 81   Temp 96 °F (35.6 °C)   Ht 5' 2\" (1.575 m)   Wt 220 lb 12.8 oz (100.2 kg)   SpO2 98% Comment: on ra  BMI 40.38 kg/m²     BMI:  Body mass index is 40.38 kg/m².      Mallampati airway Class:  4  Neck Circumference:  14 Inches  [place] sleepiness score 20: 8  Sleep apnea Quality of Life Questionnaire Score:53    Physical Exam :  Nursing note and vitals reviewed. Constitutional: Patient appears moderately built and moderately nourished. No distress. Patient is oriented to person, place, and time. HENT:   Head: Normocephalic and atraumatic. Right Ear: External ear normal.   Left Ear: External ear normal.   Mouth/Throat: Oropharynx is clear and moist.  No oral thrush. Eyes: Conjunctivae are normal. Pupils are equal, round, and reactive to light. No scleral icterus. Neck: Neck supple. No JVD present. No tracheal deviation present. Cardiovascular: Normal rate, regular rhythm, normal heart sounds. No murmur heard. Pulmonary/Chest: Effort normal and breath sounds normal. No stridor. No respiratory distress. No wheezes. No rales. Patient exhibits no tenderness. Abdominal: Soft. Patient exhibits no distension. No tenderness. Musculoskeletal: Normal range of motion. Extremities: Patient exhibits no edema and no tenderness. Lymphadenopathy:  No cervical adenopathy. Neurological: Patient is alert and oriented to person, place, and time. Skin: Skin is warm and dry. Patient is not diaphoretic. Psychiatric: Patient  has a normal mood and affect. Patient behavior is normal.     Diagnostic Data:    Echocardiogram:None available in [facility]. [address] [place] [id number]                             CARDIAC CATHETERIZATION     PATIENT NAME: Kamila Goss                    :        1955  MED REC NO:   [id number]                           ROOM:       [id number]  ACCOUNT NO:   [de-identified]                           ADMIT DATE: 10/24/2019  PROVIDER:     Aleksander Sharp. NAMAN [place]:  10/24/2019       IMPRESSION:  1.   Moderately severe pulmonary hypertension with the PA pressure of  69/22 and wedge around 30.  2.  Diastolic dysfunction of the left ventricle. Left ventricular  end-diastolic pressure around 27.  3.  Mitral valve prolapse and 2 to 3+ mitral regurg and no gradient  across the aortic valve. 4.  The apparent takeoff of the RCA and RCA has _____ nonobstructive  disease in its mid course. 5.  Left main is very short, it bifurcates to the LAD and circumflex. The circumflex was patent. The LAD also was patent. The patient has no  acute complication from the procedure. SUMMARY:  1.  Preserved systolic function of the left ventricle and ejection  fraction about 55%. 2.  2+ mitral regurgitation at least moderate central jet with mitral  valve apparatus prolapse, mild. 3.  Patent coronary arteries. 4.  Moderately severe pulmonary hypertension with no step-up in the  oxygen saturation from different chambers of the right side of the  heart. 5.  Diastolic dysfunction of the left ventricle. 6.  Estimated blood loss less than 50 mL. The patient has no acute  complication from the procedure. RECOMMENDATION:  The patient will be seen by Pulmonary Service for sleep  apnea and for her pulmonary hypertension. The echocardiogram will be  reviewed and keep very close eye on this pulmonary hypertension and the  mitral regurg. The patient will continue with the aggressive medical  treatment. Diagnostic Data: 2/17/20   AHI  11.8  PAP Download:    Recorded compliance dates:  10/10/20 -  11/8/20  More than 4hour usage compliance was:  30%. Average residual Apnea- Hypoapnea index on current pressue was:2.8        PAP Type airsense 10    Level  7 cmh2o       Average usuage hours per day was: 3:56     Interface: ff      Provider:  []?-ARABELLA             []?John                        []?German          []? Werner                           []?[facility]      [x]? Other:  nwo      Assesment:  -Mild obstructive sleep apnea on treatment with a CPAP pressure of 7cm H20 - she is using her CPAP device with poor compliance to > hours of therapy.   She is having suffocation feeling sometimes with the current pressure settings. Her respiratory events were under good control with current therapy. Her clinical symptoms improving. She is benefiting from current CPAP therapy and would like to continue the therapy.  -Hypertension- under control.  -Hypersomnia most likely due to sleep apnea- improving with CPAP therapy.  -Chronic diastolic congestive heart failure. -Moderately severe pulmonary hypertension with increased wedge Pressure.  -Allergic rhinitis-not under control. She is currently on treatment with Flonase.  -Chronic hx of tobacco smoking. She is still smoking- ? COPD as an etiology for her chronic cough- Need further evaluation. Recommendations/Plan:  -Will change her current CPAP pressure settings I.e from a CPAP of 7cm H20 to Auto CPAP pressures with a Minimum CPAP of 4cm H20 and Maximum CPAP of 12cm H20.  -Continue patient on Flonase 50mcg/INH 2sprays each nostril daily.  -She was advised to continue above positive airway pressure therapy with above described pressure.  -She was advised to keep good compliance with current recommended pressure to get optimal results and clinical improvement.   -Schedule patient for Ear, Nose and Throat specialist consultation as soon as possible for further evaluation of uncontrolled allergic rhinitis and ? Chronic sinusitis. She is having difficulty in continuing her CPAP therapy as prescribed. Please refer to printed/Epic order sheet regarding the referring provider details.  -She was advised to call [employer] regarding supplies if needed. - She is aware of the consequences of poor compliance to her recommended positive air way pressure therapy including increased risk for cardiovascular and cerebrovascular events and morbidity including death.  -She was informed about the possibility of loosing his CPAP if she don't use it with good compliance for >4hours i.e >70%.  She verbalizes understanding.  -Schedule patient

## 2020-11-11 ENCOUNTER — OFFICE VISIT (OUTPATIENT)
Dept: PULMONOLOGY | Age: 65
End: 2020-11-11
Payer: COMMERCIAL

## 2020-11-11 VITALS
HEART RATE: 81 BPM | HEIGHT: 62 IN | TEMPERATURE: 96 F | BODY MASS INDEX: 40.63 KG/M2 | OXYGEN SATURATION: 98 % | SYSTOLIC BLOOD PRESSURE: 116 MMHG | DIASTOLIC BLOOD PRESSURE: 72 MMHG | WEIGHT: 220.8 LBS

## 2020-11-11 PROCEDURE — 99214 OFFICE O/P EST MOD 30 MIN: CPT | Performed by: INTERNAL MEDICINE

## 2020-11-11 PROCEDURE — G8417 CALC BMI ABV UP PARAM F/U: HCPCS | Performed by: INTERNAL MEDICINE

## 2020-11-11 PROCEDURE — 3017F COLORECTAL CA SCREEN DOC REV: CPT | Performed by: INTERNAL MEDICINE

## 2020-11-11 PROCEDURE — G8484 FLU IMMUNIZE NO ADMIN: HCPCS | Performed by: INTERNAL MEDICINE

## 2020-11-11 PROCEDURE — 4004F PT TOBACCO SCREEN RCVD TLK: CPT | Performed by: INTERNAL MEDICINE

## 2020-11-11 PROCEDURE — G8427 DOCREV CUR MEDS BY ELIG CLIN: HCPCS | Performed by: INTERNAL MEDICINE

## 2020-11-11 NOTE — PATIENT INSTRUCTIONS
Patient Education        Stopping Smoking: Care Instructions  Your Care Instructions     Cigarette smokers crave the nicotine in cigarettes. Giving it up is much harder than simply changing a habit. Your body has to stop craving the nicotine. It is hard to quit, but you can do it. There are many tools that people use to quit smoking. You may find that combining tools works best for you. There are several steps to quitting. First you get ready to quit. Then you get support to help you. After that, you learn new skills and behaviors to become a nonsmoker. For many people, a necessary step is getting and using medicine. Your doctor will help you set up the plan that best meets your needs. You may want to attend a smoking cessation program to help you quit smoking. When you choose a program, look for one that has proven success. Ask your doctor for ideas. You will greatly increase your chances of success if you take medicine as well as get counseling or join a cessation program.  Some of the changes you feel when you first quit tobacco are uncomfortable. Your body will miss the nicotine at first, and you may feel short-tempered and grumpy. You may have trouble sleeping or concentrating. Medicine can help you deal with these symptoms. You may struggle with changing your smoking habits and rituals. The last step is the tricky one: Be prepared for the smoking urge to continue for a time. This is a lot to deal with, but keep at it. You will feel better. Follow-up care is a key part of your treatment and safety. Be sure to make and go to all appointments, and call your doctor if you are having problems. It's also a good idea to know your test results and keep a list of the medicines you take. How can you care for yourself at home? · Ask your family, friends, and coworkers for support. You have a better chance of quitting if you have help and support.   · Join a support group, such as Nicotine Anonymous, for people who are trying to quit smoking. · Consider signing up for a smoking cessation program, such as the American Lung Association's Freedom from Smoking program.  · Get text messaging support. Go to the website at www.smokefree. gov to sign up for the [facility] program.  · Set a quit date. Pick your date carefully so that it is not right in the middle of a big deadline or stressful time. Once you quit, do not even take a puff. Get rid of all ashtrays and lighters after your last cigarette. Clean your house and your clothes so that they do not smell of smoke. · Learn how to be a nonsmoker. Think about ways you can avoid those things that make you reach for a cigarette. ? Avoid situations that put you at greatest risk for smoking. For some people, it is hard to have a drink with friends without smoking. For others, they might skip a coffee break with coworkers who smoke. ? Change your daily routine. Take a different route to work or eat a meal in a different place. · Cut down on stress. Calm yourself or release tension by doing an activity you enjoy, such as reading a book, taking a hot bath, or gardening. · Talk to your doctor or pharmacist about nicotine replacement therapy, which replaces the nicotine in your body. You still get nicotine but you do not use tobacco. Nicotine replacement products help you slowly reduce the amount of nicotine you need. These products come in several forms, many of them available over-the-counter:  ? Nicotine patches  ? Nicotine gum and lozenges  ? Nicotine inhaler  · Ask your doctor about bupropion (Wellbutrin) or varenicline (Chantix), which are prescription medicines. They do not contain nicotine. They help you by reducing withdrawal symptoms, such as stress and anxiety. · Some people find hypnosis, acupuncture, and massage helpful for ending the smoking habit. · Eat a healthy diet and get regular exercise.  Having healthy habits will help your body move past its craving for risk for cardiovascular and cerebrovascular events and morbidity including death.  -She was informed about the possibility of loosing his CPAP if she don't use it with good compliance for >4hours i.e >70%. She verbalizes understanding.  -Schedule patient for chest X-ray PA and Lateral views in 1month to evaluate her lung fields due to hx of tobacco smoking. Chest Xray need to be done 2days before clinic visit.  -Schedule patient for full pulmonary function tests before clinic visit. -Will schedule patient for follow with my pulmonary clinic or any available provider at center for pulmonary disease with above Chest Xray and PFTS to further evaluate and manage COPD in 1month. - Patient educated to quit smoking as soon as possible. Patient verbalizes understanding of adverse consequences of tobacco smoking.  -She was advised to loose weight by controlling diet and doing exercise once cleared by her Cardiologist Dr. Micaela Ortiz.   -She was advised to call my office for earlier appointment if needed for worsening of sleep symptoms. --Follow with my clinic in 3months for clinical reevaluation with review of download. -Jorge Overton was educated about my impression and plan. Patient verbalizes understanding.

## 2020-11-11 NOTE — PROGRESS NOTES
Chief Complaint:  Nathan Forbes is here for a  3 month sleep follow up    Mallampati airway Class:  4  Neck Circumference:  14 Inches    [place] sleepiness score 11/11/20:  ***. Diagnostic Data: 2/17/20   AHI  11.8  PAP Download:    Recorded compliance dates:  10/10/20 -  11/8/20  More than 4hour usage compliance was:  30%.   Average residual Apnea- Hypoapnea index on current pressue was:2.8       PAP Type airsense 10    Level  7 cmh2o       Average usuage hours per day was: 3:56    Interface: ffm     Provider:  []-[facility]  []John  []German  []Guillermoare         [][facility] [x]Other:  nwo

## 2021-01-25 DIAGNOSIS — Z99.89 OSA ON CPAP: ICD-10-CM

## 2021-01-25 DIAGNOSIS — Z72.0 TOBACCO ABUSE: ICD-10-CM

## 2021-01-25 DIAGNOSIS — R05.9 COUGH IN ADULT: ICD-10-CM

## 2021-01-25 DIAGNOSIS — J30.9 ALLERGIC RHINITIS, UNSPECIFIED SEASONALITY, UNSPECIFIED TRIGGER: ICD-10-CM

## 2021-01-25 DIAGNOSIS — G47.33 OSA ON CPAP: ICD-10-CM

## 2022-08-18 ENCOUNTER — NURSE ONLY (OUTPATIENT)
Dept: LAB | Age: 67
End: 2022-08-18

## 2022-08-19 LAB
CHOLESTEROL, TOTAL: 166 MG/DL (ref 100–199)
HDLC SERPL-MCNC: 54 MG/DL
TSH SERPL DL<=0.05 MIU/L-ACNC: 1.2 UIU/ML (ref 0.4–4.2)

## 2022-08-29 LAB — CYTOLOGY THIN PREP PAP: NORMAL
